# Patient Record
Sex: FEMALE | Race: WHITE | URBAN - METROPOLITAN AREA
[De-identification: names, ages, dates, MRNs, and addresses within clinical notes are randomized per-mention and may not be internally consistent; named-entity substitution may affect disease eponyms.]

---

## 2020-03-17 ENCOUNTER — COMMUNICATION - HEALTHEAST (OUTPATIENT)
Dept: SCHEDULING | Facility: CLINIC | Age: 30
End: 2020-03-17

## 2021-06-06 NOTE — TELEPHONE ENCOUNTER
"Patient calling says she has been in contact with individuals who have tested positive for coronavirus.  She says she now has a cough started 2 days ago.    No fever.  No difficulty breathing.    Triaged to disposition of See PCP within 24 Hours.  Advised patient to use [facility].  She says she tried and could not get it to work.  Advised patient a message will be sent to one of the testing clinics and she will receive a call tomorrow with further instructions.    Care advice given per protocol: Isolate yourself at home.  Do not allow any visitors.  Do not go to work or school.  Do not go to Sh[employer],  centers, shopping or other public places.  Do not shake hands.  Avoid close contact with others.  Wash your hands with soap and water frequently.    Advised to call back if breathing difficulty occurs or symptoms worsen.    Yesenia Cortes RN  Triage Nurse Advisor      Reason for Disposition    [1] Cough occurs AND [2] within 14 days of CORONAVIRUS EXPOSURE    Answer Assessment - Initial Assessment Questions  1. PLACE of CONTACT: \"Where were you when you were exposed to coronavirus?\" (e.g., city, state, country)      [place], [place]  2. TYPE of CONTACT: \"How much contact was there?\" (e.g., live in same house, work in same office, same school)      Was out 3/14/2020 at the bar for a [employer] day event with 1 person who tested positive.  The following day she was with a friend and her mother - the mother tested positive.  Sitting next to eachother.  Kissed same grandkids.  3. DATE of CONTACT: \"When did you have contact with a coronavirus patient?\" (e.g., days)     3/14/2020 and 3/15/2020  4. SYMPTOMS: \"Do you have any symptoms?\" (e.g., fever, cough, breathing difficulty)      cough  5. PREGNANCY OR POSTPARTUM: \"Is there any chance you are pregnant?\" \"When was your last menstrual period?\" \"Did you deliver in the last 2 weeks?\"      Last menstrual period one month ago.  States she is not pregnant.  6. HIGH RISK: \"Do " "you have any heart or lung problems? Do you have a weakened immune system?\" (e.g., CHF, COPD, asthma, HIV positive, chemotherapy, renal failure, diabetes mellitus, sickle cell anemia)      no    Protocols used: CORONAVIRUS (2019-NCOV) EXPOSURE-A-AH      "

## 2021-06-06 NOTE — TELEPHONE ENCOUNTER
Agree with advise given.  We are currently only testing those with severe symptoms.  Patient should isolate at home as directed by nurse yesterday.  Please notify patient.    Maria Elena Ledbetter M.D. 3/18/2020 7:17 AM

## 2021-06-07 NOTE — TELEPHONE ENCOUNTER
Attempted to fax work note to the phone number given by the patient.  The fax number is no longer working.  Patient will attempt to find another place to fax the letter to.

## 2021-06-07 NOTE — TELEPHONE ENCOUNTER
Patient Returning Call  Reason for call:  Return call   Information relayed to patient:  Caller was informed of message below.   Patient has additional questions:  Yes  If YES, what are your questions/concerns:  Caller stated that she feels hot flashes every now and then and that started today. Caller stated is there a way she can get a note stating that she needs to stay home due to her work place is just really strict and still wants them to go to work even when sick. Caller stated that she will need to know the location to  the note due to not knowing clinic locations.   Okay to leave a detailed message?: Yes  [phone] or work phone [phone]

## 2021-06-07 NOTE — TELEPHONE ENCOUNTER
Unable to fax letter to patient's work.  Patient has not responded with fax information.  No address listed in chart to mail letter.

## 2021-06-20 NOTE — LETTER
Letter by Yesenia Cortes RN at      Author: Yesenia Cortes RN Service: -- Author Type: --    Filed:  Encounter Date: 3/17/2020 Status: (Other)         March 18, 2020     Patient: Katina Powers   YOB: 1990   Date of Visit: 3/17/2020       To Whom It May Concern:       Care advice given per protocol: Isolate yourself at home.  Do not allow any visitors.  Do not go to work or school.  Do not go to [employer],  centers, shopping or other public places.  Do not shake hands.  Avoid close contact with others.  Wash your hands with soap and water frequently.     Advised to call back if breathing difficulty occurs or symptoms worsen.    If you have any questions or concerns, please don't hesitate to call.    Sincerely,        Electronically signed by Yesenia Cortes RN